# Patient Record
Sex: MALE | Race: WHITE | NOT HISPANIC OR LATINO | URBAN - METROPOLITAN AREA
[De-identification: names, ages, dates, MRNs, and addresses within clinical notes are randomized per-mention and may not be internally consistent; named-entity substitution may affect disease eponyms.]

---

## 2018-06-06 ENCOUNTER — OFFICE VISIT (OUTPATIENT)
Dept: URBAN - METROPOLITAN AREA CLINIC 18 | Facility: CLINIC | Age: 68
End: 2018-06-06
Payer: MEDICARE

## 2018-06-06 PROCEDURE — 99213 OFFICE O/P EST LOW 20 MIN: CPT | Performed by: OPTOMETRIST

## 2018-06-06 RX ORDER — PREDNISOLONE ACETATE 10 MG/ML
1 % SUSPENSION/ DROPS OPHTHALMIC
Qty: 1 | Refills: 0 | Status: INACTIVE
Start: 2018-06-06 | End: 2019-04-29

## 2018-06-06 ASSESSMENT — INTRAOCULAR PRESSURE
OD: 12
OS: 12

## 2018-06-06 NOTE — IMPRESSION/PLAN
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 Plan: Discussed diagnosis in detail with patient. Advised patient of condition. Discussed treatment options with patient. Start Pred Acetate QID OS x 1 week (called into [facility] pharmacy), continue with Zovirax 800 mg and D/C Gentamycin. Will continue to observe condition and or symptoms.

## 2018-06-13 ENCOUNTER — OFFICE VISIT (OUTPATIENT)
Dept: URBAN - METROPOLITAN AREA CLINIC 18 | Facility: CLINIC | Age: 68
End: 2018-06-13
Payer: MEDICARE

## 2018-06-13 DIAGNOSIS — B02.39 OTHER HERPES ZOSTER EYE DISEASE: Primary | ICD-10-CM

## 2018-06-13 PROCEDURE — 99213 OFFICE O/P EST LOW 20 MIN: CPT | Performed by: OPTOMETRIST

## 2018-06-13 ASSESSMENT — INTRAOCULAR PRESSURE
OD: 18
OS: 15

## 2018-06-13 NOTE — IMPRESSION/PLAN
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 Condition: improving/nearly resolved. Plan: Discussed diagnosis in detail with patient. Pt to d/c Pred Acetate and Acycolovir at this point. Recommend AFTs for comfort.

## 2019-04-29 ENCOUNTER — OFFICE VISIT (OUTPATIENT)
Dept: URBAN - METROPOLITAN AREA CLINIC 18 | Facility: CLINIC | Age: 69
End: 2019-04-29
Payer: MEDICARE

## 2019-04-29 DIAGNOSIS — H25.13 AGE-RELATED NUCLEAR CATARACT, BILATERAL: ICD-10-CM

## 2019-04-29 DIAGNOSIS — H02.831 DERMATOCHALASIS OF RIGHT UPPER EYELID: Primary | ICD-10-CM

## 2019-04-29 DIAGNOSIS — H02.005 ENTROPION OF LEFT LOWER EYELID: ICD-10-CM

## 2019-04-29 DIAGNOSIS — H02.834 DERMATOCHALASIS OF LEFT UPPER EYELID: ICD-10-CM

## 2019-04-29 PROCEDURE — 99214 OFFICE O/P EST MOD 30 MIN: CPT | Performed by: OPTOMETRIST

## 2019-04-29 ASSESSMENT — KERATOMETRY
OD: 48.38
OS: 42.13

## 2019-04-29 ASSESSMENT — INTRAOCULAR PRESSURE
OD: 11
OS: 14

## 2019-04-29 NOTE — IMPRESSION/PLAN
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 Coupon given  today for Refresh Advanced.

## 2019-04-29 NOTE — IMPRESSION/PLAN
Impression: Age-related nuclear cataract, bilateral: H25.13. Plan: Cataracts account for the patient's complaints. No treatment currently recommended. The patient will monitor vision changes and contact us with any decrease in vision. Suggested to hold off on Rx glasses until seen with Dr. Priyanka Lux for further treatment/surgery.

## 2019-04-29 NOTE — IMPRESSION/PLAN
Impression: Dermatochalasis of right upper eyelid: H02.831. Hx of shingles OS Vision is affected OS>OD  Plan: Discussed diagnosis in detail with patient. Advised patient of condition. Discussed risks and benefits and patient understands. Discussed treatment options with patient. Consult recommended [[facility] Surgeon].

## 2019-06-13 ENCOUNTER — OFFICE VISIT (OUTPATIENT)
Dept: URBAN - METROPOLITAN AREA CLINIC 17 | Facility: CLINIC | Age: 69
End: 2019-06-13
Payer: MEDICARE

## 2019-06-13 PROCEDURE — 92002 INTRM OPH EXAM NEW PATIENT: CPT | Performed by: OPHTHALMOLOGY

## 2019-06-13 PROCEDURE — 92012 INTRM OPH EXAM EST PATIENT: CPT | Performed by: OPHTHALMOLOGY

## 2019-06-13 NOTE — IMPRESSION/PLAN
Impression: Trichiasis without entropion left upper eyelid: H02.054. OS. Condition: established, worsening. Symptoms: may improve with surgery. Vision: vision affected. Plan: Discussed diagnosis in detail with patient. Discussed treatment options with patient. Surgical treatment is an option. Surgical risks and benefits were discussed, explained and understood by patient. Any surgical procedure may reactivate shingles and patient understands this. Patient will consider surgery. Patient instructed to use artificial tears aggressively for extended comfort. Will continue to observe condition and or symptoms. Patient will inquire about shingles vaccine with his PCP at his visit tomorrow.

## 2019-06-13 NOTE — IMPRESSION/PLAN
Impression: Dermatochalasis of right upper eyelid: H02.831. OD. Condition: established, stable. Symptoms: will continue to monitor. Vision: vision threatening. Plan: Discussed diagnosis in detail with patient. Discussed treatment options with patient. No treatment is required at this time, eyelid surgery not recommended at this time due to recent shingles infection. Will continue to observe condition and or symptoms.

## 2019-06-13 NOTE — IMPRESSION/PLAN
Impression: Dermatochalasis of left upper eyelid: H02.834. OS. Condition: established, stable. Symptoms: will continue to monitor. Plan: Discussion, orders, and instructions listed in plan #1.

## 2019-09-19 ENCOUNTER — OFFICE VISIT (OUTPATIENT)
Dept: URBAN - METROPOLITAN AREA CLINIC 17 | Facility: CLINIC | Age: 69
End: 2019-09-19
Payer: MEDICARE

## 2019-09-19 PROCEDURE — 92012 INTRM OPH EXAM EST PATIENT: CPT | Performed by: OPHTHALMOLOGY

## 2019-09-19 ASSESSMENT — INTRAOCULAR PRESSURE
OS: 14
OD: 12

## 2019-09-19 NOTE — IMPRESSION/PLAN
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 OS. Condition: established, stable. Symptoms: may improve with surgery. Vision: vision threatening. Plan: Discussed diagnosis in detail with patient. Discussed treatment options with patient. Surgical treatment is required. Surgical risks and benefits were discussed, explained and understood by patient. Patient elects to defer surgery at this time. Should he decide to proceed I would recommend left upper eyelid split, removal of lashes and cryo. Would need to pretreat with antiviral before and after surgery. RL2, not on any blood thinners. Since he defers on surgery, cont aggressive lubrication daily to keep cornea moist. OK to see OD for new glasses. I do NOT recommend having any of these lashes pulled.

## 2019-12-12 ENCOUNTER — OFFICE VISIT (OUTPATIENT)
Dept: URBAN - METROPOLITAN AREA CLINIC 17 | Facility: CLINIC | Age: 69
End: 2019-12-12
Payer: MEDICARE

## 2019-12-12 DIAGNOSIS — H02.054 TRICHIASIS WITHOUT ENTROPIAN LEFT UPPER EYELID: Primary | ICD-10-CM

## 2019-12-12 PROCEDURE — 99213 OFFICE O/P EST LOW 20 MIN: CPT | Performed by: OPHTHALMOLOGY

## 2019-12-12 ASSESSMENT — INTRAOCULAR PRESSURE
OS: 12
OD: 10

## 2019-12-12 NOTE — IMPRESSION/PLAN
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 OS. Condition: established, stable. Symptoms: will continue to monitor. Vision: vision threatening. Plan: Discussed diagnosis in detail with patient. Discussed treatment options with patient. Patient defers eyelid surgery at this time. Will continue to monitor. Do not recommend any manual epilation of eyelashes, if they are short they will cause increased pain to the eye. Continue aggressive lubrication of the right eye for comfort, at least TID.